# Patient Record
Sex: FEMALE | Race: WHITE | Employment: UNEMPLOYED | URBAN - METROPOLITAN AREA
[De-identification: names, ages, dates, MRNs, and addresses within clinical notes are randomized per-mention and may not be internally consistent; named-entity substitution may affect disease eponyms.]

---

## 2017-01-04 ENCOUNTER — HOSPITAL ENCOUNTER (OUTPATIENT)
Dept: PHYSICAL THERAPY | Age: 52
Discharge: HOME | End: 2017-01-04
Payer: COMMERCIAL

## 2017-01-04 PROCEDURE — 97110 THERAPEUTIC EXERCISES: CPT | Performed by: PHYSICAL THERAPIST

## 2017-01-04 PROCEDURE — 97140 MANUAL THERAPY 1/> REGIONS: CPT | Performed by: PHYSICAL THERAPIST

## 2017-01-04 NOTE — PROGRESS NOTES
PT RE-EVAL NOTE 2-15    Patient Name: Caden Proffer  Date:2017  : 1965  [x]  Patient  Verified  Payor: BLUE CROSS / Plan: Adrienne Rowland [id number] PPO / Product Type: PPO /    In time: 11:00 AM  Out time: 1210 PM  Total Treatment Time (min): 70   Visit #: 10    Treatment Area: Left shoulder pain [M25.512]    SUBJECTIVE  Pain Level (0-10 scale): 0/10  Any medication changes, allergies to medications, adverse drug reactions, diagnosis change, or new procedure performed?: [x] No    [] Yes (see summary sheet for update)  Subjective functional status/changes:   [] No changes reported  Continues to be frustrated that certain motions really hurt. At rest 8/03 pain, with certain movements (ie taking off bra, putting on coat) pain increases to 5-6/10. Pt compliant with HEP. She is no longer having the \"hitching\" that she was reporting at initial eval.    OBJECTIVE    Modality rationale: decrease pain and increase tissue extensibility to improve the patients ability to reach into cabinet.    Min Type Additional Details    [] Estim: []Att   []Unatt        []TENS instruct                  []IFC  []Premod   []NMES                     []Other:  []w/US   []w/ice   []w/heat  Position:  Location:    []  Traction: [] Cervical       []Lumbar                       [] Prone          []Supine                       []Intermittent   []Continuous Lbs:  [] before manual  [] after manual  []w/heat    []  Ultrasound: []Continuous   [] Pulsed at:                            []1MHz   []3MHz Location:  W/cm2:    []  Paraffin         Location:  []w/heat   10 [x]  Ice (post)     []  Heat []  Ice massage Position: seated  Location: L shoulder    []  Laser  []  Other: Position:  Location:    []  Vasopneumatic Device Pressure:       [] lo [] med [] hi   Temperature:    [x] Skin assessment post-treatment:  [x]intact []redness- no adverse reaction    []redness  adverse reaction:     35 min Therapeutic Exercise:  [x] See flow sheet : Re-eval, see below. Added prone scap retractions, prone row, prone shoulder ext   Rationale: increase ROM and increase strength to improve the patients ability to carry objects. -- min Neuromuscular Re-education:  [x]  See flow sheet :    Rationale: increase ROM and increase strength  to improve the patients ability to return to [employer]. 25 min Manual Therapy:  PROM L shoulder flex, IR, ER; posterior/inferior grade II-III mobs; STM R bicep tendon   Rationale: decrease pain, increase ROM and increase tissue extensibility  to improve the patients ability to lift objects. With   [x] TE   [] TA   [] neuro   [] other: Patient Education: [x] Review HEP    [] Progressed/Changed HEP based on:   [] positioning   [] body mechanics   [] transfers   [x] heat/ice application    [x] other: massage L bicep tendon     Other Objective/Functional Measures:   Severe TTP L bicep tendon and muscle belly  AROM flex 175 deg, pain  PROM flex 175 deg, pain; PROM scap approx 150 deg, pain  PROM ER 65 deg, pain  PROM IR 55 deg, pain  Strength    Flex 4+/5, pain   Scap 4/5, pain   IR 5/5, no pain   ER, 4+/5, no pain     FOTO= 60/100    Pain Level (0-10 scale) post treatment: 0/10    ASSESSMENT/Changes in Function:     Pt has completed 10 skilled PT visits at this time. . Pt has met 3/4 short term goals and 0/4 long term goals. Pt's FOTO decreased from 63 at initial eval to 60/100. Pt with increased AROM flexion, however continues to have decreased PROM ER, IR. Pt has increased overall shoulder strength since initial eval. Added prone scapular retractions, prone row, and prone shoulder ext today. Patient will continue to benefit from skilled PT services to modify and progress therapeutic interventions, address functional mobility deficits, address ROM deficits, address strength deficits, analyze and address soft tissue restrictions and analyze and cue movement patterns to attain remaining goals.      [x] See Plan of Care  []  See progress note/recertification  []  See Discharge Summary         Progress towards goals / Updated goals:  Short Term Goals: To be accomplished in 3 weeks:  1) Pt will be independent in HEP. MET  2) Pt will report being able to sleep throughout the night without being woken from pain. MET  3) Pt will report < or = 2/10 pain at worst throughout the day in order to perform ADL's. Progressing  4) Pt will report decrease in \"hitching\" of L shoulder by at least 50% in order to don/doff clothing. MET     Long Term Goals: To be accomplished in 6-8 weeks:  1) Pt will increase L shoulder flexion ROM to 180 deg in order to reach into cabinets. Progressing  2) Pt will increase L shoulder ER ROM to at least 90 deg in order to wash/dry hair. Progressing  3) Pt will increase L shoulder overall strength to at least 4+/5 in order to perform pottery work.  Progressing  4) Pt will report < or = 1/10 pain throughout the day in order to lift groceries and perform ADL's.   Progressing    PLAN  [x]  Upgrade activities as tolerated     [x]  Continue plan of care  []  Update interventions per flow sheet       []  Discharge due to:_  []  Other:_      Phani Morton PT 1/4/2017  11:44 AM

## 2017-01-06 ENCOUNTER — HOSPITAL ENCOUNTER (OUTPATIENT)
Dept: PHYSICAL THERAPY | Age: 52
Discharge: HOME | End: 2017-01-06
Payer: COMMERCIAL

## 2017-01-06 PROCEDURE — 97140 MANUAL THERAPY 1/> REGIONS: CPT | Performed by: PHYSICAL THERAPIST

## 2017-01-06 PROCEDURE — 97110 THERAPEUTIC EXERCISES: CPT | Performed by: PHYSICAL THERAPIST

## 2017-01-06 NOTE — PROGRESS NOTES
PT DAILY NOTE 2-15    Patient Name: Eugenia Champagne  Date:2017  : 1965  [x]  Patient  Verified  Payor: BLUE CROSS / Plan: Adrienne Rowland [id number] PPO / Product Type: PPO /    In time: 8:00 AM  Out time: 850 AM  Total Treatment Time (min): 50   Visit #: 11    Treatment Area: Left shoulder pain [M25.512]    SUBJECTIVE  Pain Level (0-10 scale): 1/10  Any medication changes, allergies to medications, adverse drug reactions, diagnosis change, or new procedure performed?: [x] No    [] Yes (see summary sheet for update)  Subjective functional status/changes:   [] No changes reported  \"A little pain today because I slept on it wrong last night\"  Pt has been trying to use L UE as much as possible with lifting, reaching for objects   Pt has to leave 10 min early today to make a doctor's appt    OBJECTIVE    Modality rationale: decrease pain and increase tissue extensibility to improve the patients ability to reach into cabinet.    Min Type Additional Details    [] Estim: []Att   []Unatt        []TENS instruct                  []IFC  []Premod   []NMES                     []Other:  []w/US   []w/ice   []w/heat  Position:  Location:    []  Traction: [] Cervical       []Lumbar                       [] Prone          []Supine                       []Intermittent   []Continuous Lbs:  [] before manual  [] after manual  []w/heat    []  Ultrasound: []Continuous   [] Pulsed at:                            []1MHz   []3MHz Location:  W/cm2:    []  Paraffin         Location:  []w/heat   10 [x]  Ice (post)     []  Heat []  Ice massage Position: seated  Location: L shoulder    []  Laser  []  Other: Position:  Location:    []  Vasopneumatic Device Pressure:       [] lo [] med [] hi   Temperature:    [x] Skin assessment post-treatment:  [x]intact []redness- no adverse reaction    []redness  adverse reaction:     15 min Therapeutic Exercise:  [x] See flow sheet : pulleys, prone scap retractions, shoulder ext, row; wand ER, flex, scap  Added IR with pulleys today   Rationale: increase ROM and increase strength to improve the patients ability to carry objects. -- min Neuromuscular Re-education:  [x]  See flow sheet :    Rationale: increase ROM and increase strength  to improve the patients ability to return to [employer]. 25 min Manual Therapy:  PROM L shoulder flex, IR, ER; posterior/inferior grade II-III mobs; STM R bicep tendon   Rationale: decrease pain, increase ROM and increase tissue extensibility  to improve the patients ability to lift objects. With   [x] TE   [] TA   [] neuro   [] other: Patient Education: [x] Review HEP    [] Progressed/Changed HEP based on:   [] positioning   [] body mechanics   [] transfers   [x] heat/ice application    [x] other: massage L bicep tendon     Other Objective/Functional Measures:   n/t    Pain Level (0-10 scale) post treatment: 0/10    ASSESSMENT/Changes in Function:     Pt with decreased TTP over L bicep tendon today compared to past visits. She continues to have limited PROM, compliant with HEP. Added IR with pulleys today. Overall tolerated therapy session well. Patient will continue to benefit from skilled PT services to modify and progress therapeutic interventions, address functional mobility deficits, address ROM deficits, address strength deficits, analyze and address soft tissue restrictions and analyze and cue movement patterns to attain remaining goals. [x]  See Plan of Care  []  See progress note/recertification  []  See Discharge Summary         Progress towards goals / Updated goals:  Short Term Goals: To be accomplished in 3 weeks:  1) Pt will be independent in HEP. MET  2) Pt will report being able to sleep throughout the night without being woken from pain. MET  3) Pt will report < or = 2/10 pain at worst throughout the day in order to perform ADL's.  Progressing  4) Pt will report decrease in \"hitching\" of L shoulder by at least 50% in order to don/doff clothing. MET     Long Term Goals: To be accomplished in 6-8 weeks:  1) Pt will increase L shoulder flexion ROM to 180 deg in order to reach into cabinets. Progressing  2) Pt will increase L shoulder ER ROM to at least 90 deg in order to wash/dry hair. Progressing  3) Pt will increase L shoulder overall strength to at least 4+/5 in order to perform pottery work.  Progressing  4) Pt will report < or = 1/10 pain throughout the day in order to lift groceries and perform ADL's.   Progressing    PLAN  [x]  Upgrade activities as tolerated     [x]  Continue plan of care  []  Update interventions per flow sheet       []  Discharge due to:_  []  Other:_      Annabelle Oviedo PT 1/6/2017  11:44 AM

## 2017-01-11 ENCOUNTER — HOSPITAL ENCOUNTER (OUTPATIENT)
Dept: PHYSICAL THERAPY | Age: 52
Discharge: HOME | End: 2017-01-11
Payer: COMMERCIAL

## 2017-01-11 PROCEDURE — 97110 THERAPEUTIC EXERCISES: CPT | Performed by: PHYSICAL THERAPIST

## 2017-01-11 PROCEDURE — 97140 MANUAL THERAPY 1/> REGIONS: CPT | Performed by: PHYSICAL THERAPIST

## 2017-01-11 NOTE — PROGRESS NOTES
PT DAILY NOTE 2-15    Patient Name: Loida Flannery  Date:2017  : 1965  [x]  Patient  Verified  Payor: BLUE CROSS / Plan: Adrienne Rowland [id number] PPO / Product Type: PPO /    In time: 10:30 AM  Out time: 11:30 AM  Total Treatment Time (min): 60  Visit #: 12    Treatment Area: Left shoulder pain [M25.512]    SUBJECTIVE  Pain Level (0-10 scale): 0/10  Any medication changes, allergies to medications, adverse drug reactions, diagnosis change, or new procedure performed?: [x] No    [] Yes (see summary sheet for update)  Subjective functional status/changes:   [] No changes reported  \"I feel good today\". Pt got FR and has been doing jovanny stretch at home. OBJECTIVE    Modality rationale: decrease pain and increase tissue extensibility to improve the patients ability to reach into cabinet. Min Type Additional Details    [] Estim: []Att   []Unatt        []TENS instruct                  []IFC  []Premod   []NMES                     []Other:  []w/US   []w/ice   []w/heat  Position:  Location:    []  Traction: [] Cervical       []Lumbar                       [] Prone          []Supine                       []Intermittent   []Continuous Lbs:  [] before manual  [] after manual  []w/heat    []  Ultrasound: []Continuous   [] Pulsed at:                            []1MHz   []3MHz Location:  W/cm2:    []  Paraffin         Location:  []w/heat   10 [x]  Ice (post)     []  Heat []  Ice massage Position: seated  Location: L shoulder    []  Laser  []  Other: Position:  Location:    []  Vasopneumatic Device Pressure:       [] lo [] med [] hi   Temperature:    [x] Skin assessment post-treatment:  [x]intact []redness- no adverse reaction    []redness  adverse reaction:     25 min Therapeutic Exercise:  [x] See flow sheet : pulleys, prone scap retractions, shoulder ext, row; wand ER, flex, scap.  IR with pulleys   Added sidelying ER, AROM D1, D2 supine; tband IR   Rationale: increase ROM and increase strength to improve the patients ability to carry objects. -- min Neuromuscular Re-education:  [x]  See flow sheet :    Rationale: increase ROM and increase strength  to improve the patients ability to return to [employer]. 25 min Manual Therapy:  PROM L shoulder flex, IR, ER; posterior/inferior grade II-III mobs; STM R bicep tendon   Rationale: decrease pain, increase ROM and increase tissue extensibility  to improve the patients ability to lift objects. With   [x] TE   [] TA   [] neuro   [] other: Patient Education: [x] Review HEP    [] Progressed/Changed HEP based on:   [] positioning   [] body mechanics   [] transfers   [x] heat/ice application    [x] other: massage L bicep tendon     Other Objective/Functional Measures:   n/t    Pain Level (0-10 scale) post treatment: 0/10    ASSESSMENT/Changes in Function:     Pt tolerated light strengthening exercises today-- \"tired\" after sidelying ER and prone shoulder extension, however no increase in pain. Continues to have limited ROM, high pain levels at end range PROM ER, IR, abduction. Updated HEP to include sidelying ER without weight. Patient will continue to benefit from skilled PT services to modify and progress therapeutic interventions, address functional mobility deficits, address ROM deficits, address strength deficits, analyze and address soft tissue restrictions and analyze and cue movement patterns to attain remaining goals. [x]  See Plan of Care  []  See progress note/recertification  []  See Discharge Summary         Progress towards goals / Updated goals:  Short Term Goals: To be accomplished in 3 weeks:  1) Pt will be independent in HEP. MET  2) Pt will report being able to sleep throughout the night without being woken from pain. MET  3) Pt will report < or = 2/10 pain at worst throughout the day in order to perform ADL's.  Progressing  4) Pt will report decrease in \"hitching\" of L shoulder by at least 50% in order to don/doff clothing. MET     Long Term Goals: To be accomplished in 6-8 weeks:  1) Pt will increase L shoulder flexion ROM to 180 deg in order to reach into cabinets. Progressing  2) Pt will increase L shoulder ER ROM to at least 90 deg in order to wash/dry hair. Progressing  3) Pt will increase L shoulder overall strength to at least 4+/5 in order to perform pottery work.  Progressing  4) Pt will report < or = 1/10 pain throughout the day in order to lift groceries and perform ADL's.   Progressing    PLAN  [x]  Upgrade activities as tolerated     [x]  Continue plan of care  []  Update interventions per flow sheet       []  Discharge due to:_  []  Other:_      Sheila Ochoa, PT 1/11/2017  10:35 AM

## 2017-01-13 ENCOUNTER — HOSPITAL ENCOUNTER (OUTPATIENT)
Dept: PHYSICAL THERAPY | Age: 52
Discharge: HOME | End: 2017-01-13
Payer: COMMERCIAL

## 2017-01-13 PROCEDURE — 97110 THERAPEUTIC EXERCISES: CPT | Performed by: PHYSICAL THERAPIST

## 2017-01-13 PROCEDURE — 97140 MANUAL THERAPY 1/> REGIONS: CPT | Performed by: PHYSICAL THERAPIST

## 2017-01-13 NOTE — PROGRESS NOTES
PT DAILY NOTE 2-15    Patient Name: Rubin Allen  Date:2017  : 1965  [x]  Patient  Verified  Payor: BLUE JAY / Plan: Adrienne Rowland [id number] PPO / Product Type: PPO /    In time: 830 AM  Out time: 945 AM  Total Treatment Time (min): 75 (65 timed)  Visit #: 13    Treatment Area: Left shoulder pain [M25.512]    SUBJECTIVE  Pain Level (0-10 scale): 1/10  Any medication changes, allergies to medications, adverse drug reactions, diagnosis change, or new procedure performed?: [x] No    [] Yes (see summary sheet for update)  Subjective functional status/changes:   [] No changes reported  Sight soreness after last visit, but no increase in pain. OBJECTIVE    Modality rationale: decrease pain and increase tissue extensibility to improve the patients ability to reach into cabinet. Min Type Additional Details    [] Estim: []Att   []Unatt        []TENS instruct                  []IFC  []Premod   []NMES                     []Other:  []w/US   []w/ice   []w/heat  Position:  Location:    []  Traction: [] Cervical       []Lumbar                       [] Prone          []Supine                       []Intermittent   []Continuous Lbs:  [] before manual  [] after manual  []w/heat    []  Ultrasound: []Continuous   [] Pulsed at:                            []1MHz   []3MHz Location:  W/cm2:    []  Paraffin         Location:  []w/heat   10 [x]  Ice (post)     []  Heat []  Ice massage Position: seated  Location: L shoulder    []  Laser  []  Other: Position:  Location:    []  Vasopneumatic Device Pressure:       [] lo [] med [] hi   Temperature:    [x] Skin assessment post-treatment:  [x]intact []redness- no adverse reaction    []redness  adverse reaction:     50 min Therapeutic Exercise:  [x] See flow sheet : pulleys, prone scap retractions, shoulder ext, row; wand ER, flex, scap.  IR with pulleys   sidelying ER, AROM D1, D2 supine; tband IR  Added tband ER, serratus punches, and B ER   Rationale: increase ROM and increase strength to improve the patients ability to carry objects. -- min Neuromuscular Re-education:  [x]  See flow sheet :    Rationale: increase ROM and increase strength  to improve the patients ability to return to [employer]. 15 min Manual Therapy:  PROM L shoulder flex, IR, ER; posterior/inferior grade II-III mobs; STM R bicep tendon   Rationale: decrease pain, increase ROM and increase tissue extensibility  to improve the patients ability to lift objects. With   [x] TE   [] TA   [] neuro   [] other: Patient Education: [x] Review HEP    [] Progressed/Changed HEP based on:   [] positioning   [] body mechanics   [] transfers   [x] heat/ice application    [x] other: massage L bicep tendon     Other Objective/Functional Measures:   n/t    Pain Level (0-10 scale) post treatment: 0/10    ASSESSMENT/Changes in Function:     Pt continuing to have high pain levels while therapist performing PROM, deepali with IR and abduction. Pt tolerated strengthening exercises well today with exacerbation of symptoms. Progressing with RTC and parascapular strengthening. Patient will continue to benefit from skilled PT services to modify and progress therapeutic interventions, address functional mobility deficits, address ROM deficits, address strength deficits, analyze and address soft tissue restrictions and analyze and cue movement patterns to attain remaining goals. [x]  See Plan of Care  []  See progress note/recertification  []  See Discharge Summary         Progress towards goals / Updated goals:  Short Term Goals: To be accomplished in 3 weeks:  1) Pt will be independent in HEP. MET  2) Pt will report being able to sleep throughout the night without being woken from pain. MET  3) Pt will report < or = 2/10 pain at worst throughout the day in order to perform ADL's. Progressing  4) Pt will report decrease in \"hitching\" of L shoulder by at least 50% in order to don/doff clothing.  MET     Long Term Goals: To be accomplished in 6-8 weeks:  1) Pt will increase L shoulder flexion ROM to 180 deg in order to reach into cabinets. Progressing  2) Pt will increase L shoulder ER ROM to at least 90 deg in order to wash/dry hair. Progressing  3) Pt will increase L shoulder overall strength to at least 4+/5 in order to perform pottery work.  Progressing  4) Pt will report < or = 1/10 pain throughout the day in order to lift groceries and perform ADL's.   Progressing    PLAN  [x]  Upgrade activities as tolerated     [x]  Continue plan of care  []  Update interventions per flow sheet       []  Discharge due to:_  []  Other:_      Nuris House, PT 1/13/2017  10:14 AM

## 2017-01-18 ENCOUNTER — HOSPITAL ENCOUNTER (OUTPATIENT)
Dept: PHYSICAL THERAPY | Age: 52
Discharge: HOME | End: 2017-01-18
Payer: COMMERCIAL

## 2017-01-18 PROCEDURE — 97110 THERAPEUTIC EXERCISES: CPT | Performed by: PHYSICAL THERAPIST

## 2017-01-18 PROCEDURE — 97140 MANUAL THERAPY 1/> REGIONS: CPT | Performed by: PHYSICAL THERAPIST

## 2017-01-23 ENCOUNTER — HOSPITAL ENCOUNTER (OUTPATIENT)
Dept: PHYSICAL THERAPY | Age: 52
Discharge: HOME | End: 2017-01-23
Payer: COMMERCIAL

## 2017-01-23 PROCEDURE — 97110 THERAPEUTIC EXERCISES: CPT | Performed by: PHYSICAL THERAPIST

## 2017-01-23 PROCEDURE — 97140 MANUAL THERAPY 1/> REGIONS: CPT | Performed by: PHYSICAL THERAPIST

## 2017-01-23 NOTE — PROGRESS NOTES
PT DAILY NOTE 2-15    Patient Name: Stacie Garay  Date:2017  : 1965  [x]  Patient  Verified  Payor: BLUE CROSS / Plan: Adrienne Rowland [id number] PPO / Product Type: PPO /    In time: 930 AM  Out time: 1030 AM  Total Treatment Time (min): 60 (60 timed)  Visit #: 15    Treatment Area: Left shoulder pain [M25.512]    SUBJECTIVE  Pain Level (0-10 scale): 3/10  Any medication changes, allergies to medications, adverse drug reactions, diagnosis change, or new procedure performed?: [x] No    [] Yes (see summary sheet for update)  Subjective functional status/changes:   [] No changes reported  Pt went to [place] over the weekend- she took a backpack on the plane and reports \"that was a mistake, my shoulder was killing me. I tried to put it on both shoulders and then just the right one\". She got back on Saturday but the pain has been constant since then. OBJECTIVE    Modality rationale: decrease pain and increase tissue extensibility to improve the patients ability to reach into cabinet.    Min Type Additional Details    [] Estim: []Att   []Unatt        []TENS instruct                  []IFC  []Premod   []NMES                     []Other:  []w/US   []w/ice   []w/heat  Position:  Location:    []  Traction: [] Cervical       []Lumbar                       [] Prone          []Supine                       []Intermittent   []Continuous Lbs:  [] before manual  [] after manual  []w/heat    []  Ultrasound: []Continuous   [] Pulsed at:                            []1MHz   []3MHz Location:  W/cm2:    []  Paraffin         Location:  []w/heat   Held today [x]  Ice (post)     []  Heat []  Ice massage Position: seated  Location: L shoulder    []  Laser  []  Other: Position:  Location:    []  Vasopneumatic Device Pressure:       [] lo [] med [] hi   Temperature:    [x] Skin assessment post-treatment:  [x]intact []redness- no adverse reaction    []redness  adverse reaction:     45 min Therapeutic Exercise:  [x] See flow sheet : pulleys, prone scap retractions, shoulder ext, row; wand ER, flex, scap. IR with pulleys   sidelying ER, AROM D1, D2 supine; tband IR; tband ER, serratus punches, and B ER sidelying ER  Added sidelying shoulder abduction     Rationale: increase ROM and increase strength to improve the patients ability to carry objects. -- min Neuromuscular Re-education:  [x]  See flow sheet :    Rationale: increase ROM and increase strength  to improve the patients ability to return to [employer]. 15 min Manual Therapy:  PROM L shoulder flex, IR, ER; posterior/inferior grade II-III mobs; STM R bicep tendon   Rationale: decrease pain, increase ROM and increase tissue extensibility  to improve the patients ability to lift objects. With   [x] TE   [] TA   [] neuro   [] other: Patient Education: [x] Review HEP    [] Progressed/Changed HEP based on:   [] positioning   [] body mechanics   [] transfers   [x] heat/ice application    [x] other: massage L bicep tendon     Other Objective/Functional Measures:   ER= 75, pain. Capsular end feel  IR= 70, pain. Capsular end feel    Pain Level (0-10 scale) post treatment: 2/10    ASSESSMENT/Changes in Function:     Decreased sets, reps of several exercises today due to increased pain level from wearing a backpack over the weekend. Updated HEP. Pt progressing with ROM, strengthening. Patient will continue to benefit from skilled PT services to modify and progress therapeutic interventions, address functional mobility deficits, address ROM deficits, address strength deficits, analyze and address soft tissue restrictions and analyze and cue movement patterns to attain remaining goals. [x]  See Plan of Care  []  See progress note/recertification  []  See Discharge Summary         Progress towards goals / Updated goals:  Short Term Goals: To be accomplished in 3 weeks:  1) Pt will be independent in HEP.  MET  2) Pt will report being able to sleep throughout the night without being woken from pain. MET  3) Pt will report < or = 2/10 pain at worst throughout the day in order to perform ADL's. Progressing  4) Pt will report decrease in \"hitching\" of L shoulder by at least 50% in order to don/doff clothing. MET     Long Term Goals: To be accomplished in 6-8 weeks:  1) Pt will increase L shoulder flexion ROM to 180 deg in order to reach into cabinets. Progressing  2) Pt will increase L shoulder ER ROM to at least 90 deg in order to wash/dry hair. Progressing  3) Pt will increase L shoulder overall strength to at least 4+/5 in order to perform pottery work.  Progressing  4) Pt will report < or = 1/10 pain throughout the day in order to lift groceries and perform ADL's.   Progressing    PLAN  [x]  Upgrade activities as tolerated     [x]  Continue plan of care  []  Update interventions per flow sheet       []  Discharge due to:_  []  Other:_      Lucio Garcia PT 1/23/2017  9:38  AM

## 2017-01-25 ENCOUNTER — APPOINTMENT (OUTPATIENT)
Dept: PHYSICAL THERAPY | Age: 52
End: 2017-01-25
Payer: COMMERCIAL

## 2017-02-01 ENCOUNTER — HOSPITAL ENCOUNTER (OUTPATIENT)
Dept: PHYSICAL THERAPY | Age: 52
Discharge: HOME | End: 2017-02-01
Payer: COMMERCIAL

## 2017-02-01 PROCEDURE — 97110 THERAPEUTIC EXERCISES: CPT | Performed by: PHYSICAL THERAPIST

## 2017-02-01 PROCEDURE — 97140 MANUAL THERAPY 1/> REGIONS: CPT | Performed by: PHYSICAL THERAPIST

## 2017-02-01 NOTE — PROGRESS NOTES
PT DAILY NOTE 2-15    Patient Name: Roula Kingston  Date:2017  : 1965  [x]  Patient  Verified  Payor: BLUE CROSS / Plan: Adrienne Rowland [id number] PPO / Product Type: PPO /    In time: 10:35 AM  Out time: 11:40 AM  Total Treatment Time (min): 65 (55 timed)  Visit #: 16    Treatment Area: Left shoulder pain [M25.512]    SUBJECTIVE  Pain Level (0-10 scale): 0/10  Any medication changes, allergies to medications, adverse drug reactions, diagnosis change, or new procedure performed?: [x] No    [] Yes (see summary sheet for update)  Subjective functional status/changes:   [] No changes reported  Pt went to [facility] over the weekend- had massages on shoulder and \"neuromuscular therapy- basically a massage\". No pain, she thinks it feels better       OBJECTIVE    Modality rationale: decrease pain and increase tissue extensibility to improve the patients ability to reach into cabinet. Min Type Additional Details    [] Estim: []Att   []Unatt        []TENS instruct                  []IFC  []Premod   []NMES                     []Other:  []w/US   []w/ice   []w/heat  Position:  Location:    []  Traction: [] Cervical       []Lumbar                       [] Prone          []Supine                       []Intermittent   []Continuous Lbs:  [] before manual  [] after manual  []w/heat    []  Ultrasound: []Continuous   [] Pulsed at:                            []1MHz   []3MHz Location:  W/cm2:    []  Paraffin         Location:  []w/heat   10 [x]  Ice (post)     []  Heat []  Ice massage Position: seated  Location: L shoulder    []  Laser  []  Other: Position:  Location:    []  Vasopneumatic Device Pressure:       [] lo [] med [] hi   Temperature:    [x] Skin assessment post-treatment:  [x]intact []redness- no adverse reaction    []redness  adverse reaction:     40 min Therapeutic Exercise:  [x] See flow sheet : pulleys, prone scap retractions, shoulder ext, row; wand ER, flex, scap.  IR with pulleys   sidelying ER, AROM D1, D2 supine; tband IR; tband ER, serratus punches, and B ER sidelying ER, sidelying shoulder abduction  Added PG alt UE's, sleeper stretch     Rationale: increase ROM and increase strength to improve the patients ability to carry objects. -- min Neuromuscular Re-education:  [x]  See flow sheet :    Rationale: increase ROM and increase strength  to improve the patients ability to return to [employer]. 15 min Manual Therapy:  PROM L shoulder flex, IR, ER; posterior/inferior grade II-III mobs; STM R bicep tendon   Rationale: decrease pain, increase ROM and increase tissue extensibility  to improve the patients ability to lift objects. With   [x] TE   [] TA   [] neuro   [] other: Patient Education: [x] Review HEP    [] Progressed/Changed HEP based on:   [] positioning   [] body mechanics   [] transfers   [x] heat/ice application    [x] other: massage L bicep tendon     Other Objective/Functional Measures:   n/a    Pain Level (0-10 scale) post treatment: 0/10    ASSESSMENT/Changes in Function:     Progressed tband IR/ER to red- tolerated progression well. Pt fatigued after PG alt UE's and reported intense stretch with sleeper stretch. Overall pt tolerated therapy session well and is continuing to progress with strengthening program as tolerated. Patient will continue to benefit from skilled PT services to modify and progress therapeutic interventions, address functional mobility deficits, address ROM deficits, address strength deficits, analyze and address soft tissue restrictions and analyze and cue movement patterns to attain remaining goals. [x]  See Plan of Care  []  See progress note/recertification  []  See Discharge Summary         Progress towards goals / Updated goals:  Short Term Goals: To be accomplished in 3 weeks:  1) Pt will be independent in HEP. MET  2) Pt will report being able to sleep throughout the night without being woken from pain.  MET  3) Pt will report < or = 2/10 pain at worst throughout the day in order to perform ADL's. Progressing  4) Pt will report decrease in \"hitching\" of L shoulder by at least 50% in order to don/doff clothing. MET     Long Term Goals: To be accomplished in 6-8 weeks:  1) Pt will increase L shoulder flexion ROM to 180 deg in order to reach into cabinets. Progressing  2) Pt will increase L shoulder ER ROM to at least 90 deg in order to wash/dry hair. Progressing  3) Pt will increase L shoulder overall strength to at least 4+/5 in order to perform pottery work.  Progressing  4) Pt will report < or = 1/10 pain throughout the day in order to lift groceries and perform ADL's.   Progressing    PLAN  [x]  Upgrade activities as tolerated     [x]  Continue plan of care  []  Update interventions per flow sheet       []  Discharge due to:_  []  Other:_      Nohemi Lezama, PT 2/1/2017  11:03  AM

## 2017-02-08 ENCOUNTER — HOSPITAL ENCOUNTER (OUTPATIENT)
Dept: PHYSICAL THERAPY | Age: 52
Discharge: HOME | End: 2017-02-08
Payer: COMMERCIAL

## 2017-02-08 PROCEDURE — 97140 MANUAL THERAPY 1/> REGIONS: CPT | Performed by: PHYSICAL THERAPIST

## 2017-02-08 PROCEDURE — 97110 THERAPEUTIC EXERCISES: CPT | Performed by: PHYSICAL THERAPIST

## 2017-02-08 NOTE — PROGRESS NOTES
PT DAILY NOTE 2-15    Patient Name: Rubin Allen  Date:2017  : 1965  [x]  Patient  Verified  Payor: BLUE CROSS / Plan: Adrienne Rowland [id number] PPO / Product Type: PPO /    In time: 12:30 PM  Out time: 1:40 PM  Total Treatment Time (min): 70 (60 timed)  Visit #: 17    Treatment Area: Left shoulder pain [M25.512]    SUBJECTIVE  Pain Level (0-10 scale): 0/10  Any medication changes, allergies to medications, adverse drug reactions, diagnosis change, or new procedure performed?: [x] No    [] Yes (see summary sheet for update)  Subjective functional status/changes:   [] No changes reported  Pt has overall been feeling pretty good. On  she went to push herself off the stairs (reached behind) and \"I really tweaked it, it was absolutely killing me\"       OBJECTIVE    Modality rationale: decrease pain and increase tissue extensibility to improve the patients ability to reach into cabinet. Min Type Additional Details    [] Estim: []Att   []Unatt        []TENS instruct                  []IFC  []Premod   []NMES                     []Other:  []w/US   []w/ice   []w/heat  Position:  Location:    []  Traction: [] Cervical       []Lumbar                       [] Prone          []Supine                       []Intermittent   []Continuous Lbs:  [] before manual  [] after manual  []w/heat    []  Ultrasound: []Continuous   [] Pulsed at:                            []1MHz   []3MHz Location:  W/cm2:    []  Paraffin         Location:  []w/heat   10 [x]  Ice (post)     []  Heat []  Ice massage Position: seated  Location: L shoulder    []  Laser  []  Other: Position:  Location:    []  Vasopneumatic Device Pressure:       [] lo [] med [] hi   Temperature:    [x] Skin assessment post-treatment:  [x]intact []redness- no adverse reaction    []redness  adverse reaction:     40 min Therapeutic Exercise:  [x] See flow sheet : pulleys, prone scap retractions, shoulder ext, row; wand ER, flex, scap.  IR with pulleys sidelying ER, AROM D1, D2 supine; tband IR; tband ER, serratus punches, and B ER sidelying ER, sidelying shoulder abduction  PG alt UE's, sleeper stretch     Rationale: increase ROM and increase strength to improve the patients ability to carry objects. -- min Neuromuscular Re-education:  [x]  See flow sheet :    Rationale: increase ROM and increase strength  to improve the patients ability to return to [employer]. 20 min Manual Therapy:  PROM L shoulder flex, IR, ER; posterior/inferior grade II-III mobs; STM R bicep tendon   Rationale: decrease pain, increase ROM and increase tissue extensibility  to improve the patients ability to lift objects. With   [x] TE   [] TA   [] neuro   [] other: Patient Education: [x] Review HEP    [] Progressed/Changed HEP based on:   [] positioning   [] body mechanics   [] transfers   [x] heat/ice application    [x] other: massage L bicep tendon     Other Objective/Functional Measures: Mod TTP R bicep tendon  Flexion AROM= 170 deg, PROM= 180 (pain)  PROM ER= 80 deg  PROM IR= 75 deg    Pain Level (0-10 scale) post treatment: 0/10    ASSESSMENT/Changes in Function:     Pt fatigued at end of session. Pt continues to have decreased flex, ER, IR ROM however has increased ROM since beginning PT. Pt progressing with L shoulder strengthening as tolerated. Overall tolerated therapy session well. Patient will continue to benefit from skilled PT services to modify and progress therapeutic interventions, address functional mobility deficits, address ROM deficits, address strength deficits, analyze and address soft tissue restrictions and analyze and cue movement patterns to attain remaining goals. [x]  See Plan of Care  []  See progress note/recertification  []  See Discharge Summary         Progress towards goals / Updated goals:  Short Term Goals: To be accomplished in 3 weeks:  1) Pt will be independent in HEP.  MET  2) Pt will report being able to sleep throughout the night without being woken from pain. MET  3) Pt will report < or = 2/10 pain at worst throughout the day in order to perform ADL's. Progressing  4) Pt will report decrease in \"hitching\" of L shoulder by at least 50% in order to don/doff clothing. MET     Long Term Goals: To be accomplished in 6-8 weeks:  1) Pt will increase L shoulder flexion ROM to 180 deg in order to reach into cabinets. Progressing  2) Pt will increase L shoulder ER ROM to at least 90 deg in order to wash/dry hair. Progressing  3) Pt will increase L shoulder overall strength to at least 4+/5 in order to perform pottery work.  Progressing  4) Pt will report < or = 1/10 pain throughout the day in order to lift groceries and perform ADL's.   Progressing    PLAN  [x]  Upgrade activities as tolerated     [x]  Continue plan of care  []  Update interventions per flow sheet       []  Discharge due to:_  []  Other:_      Karolyn Vasquez, PT 2/8/2017  1:07 PM

## 2017-02-16 ENCOUNTER — HOSPITAL ENCOUNTER (OUTPATIENT)
Dept: PHYSICAL THERAPY | Age: 52
Discharge: HOME | End: 2017-02-16
Payer: COMMERCIAL

## 2017-02-16 PROCEDURE — 97140 MANUAL THERAPY 1/> REGIONS: CPT | Performed by: PHYSICAL THERAPIST

## 2017-02-16 PROCEDURE — 97110 THERAPEUTIC EXERCISES: CPT | Performed by: PHYSICAL THERAPIST

## 2017-02-16 NOTE — PROGRESS NOTES
PT DAILY NOTE 2-15    Patient Name: Mitch Odell  Date:2017  : 1965  [x]  Patient  Verified  Payor: BLUE CROSS / Plan: Adrienne Rowland [id number] PPO / Product Type: PPO /    In time: 1:35 PM  Out time: 2:40 PM  Total Treatment Time (min): 65 (55 timed)  Visit #: 18    Treatment Area: Left shoulder pain [M25.512]    SUBJECTIVE  Pain Level (0-10 scale): 1-2/10  Any medication changes, allergies to medications, adverse drug reactions, diagnosis change, or new procedure performed?: [x] No    [] Yes (see summary sheet for update)  Subjective functional status/changes:   [] No changes reported  Pt reports she has been feeling good and is back to doing most activities with her left arm. \"every once in a while when I go to move a certain way I'll get pain but overall its a lot better\"  She has been fighting a cold over the last week and hasn't been able to stretch as much as usual       OBJECTIVE    Modality rationale: decrease pain and increase tissue extensibility to improve the patients ability to reach into cabinet.    Min Type Additional Details    [] Estim: []Att   []Unatt        []TENS instruct                  []IFC  []Premod   []NMES                     []Other:  []w/US   []w/ice   []w/heat  Position:  Location:    []  Traction: [] Cervical       []Lumbar                       [] Prone          []Supine                       []Intermittent   []Continuous Lbs:  [] before manual  [] after manual  []w/heat    []  Ultrasound: []Continuous   [] Pulsed at:                            []1MHz   []3MHz Location:  W/cm2:    []  Paraffin         Location:  []w/heat   10 [x]  Ice (post)     []  Heat []  Ice massage Position: seated  Location: L shoulder    []  Laser  []  Other: Position:  Location:    []  Vasopneumatic Device Pressure:       [] lo [] med [] hi   Temperature:    [x] Skin assessment post-treatment:  [x]intact []redness- no adverse reaction    []redness  adverse reaction:     40 min Therapeutic Exercise:  [x] See flow sheet : pulleys, prone scap retractions, shoulder ext, row; wand ER, flex, scap. IR with pulleys   sidelying ER, AROM D1, D2 supine; tband IR; tband ER, serratus punches, and B ER sidelying ER, sidelying shoulder abduction  PG alt UE's, sleeper stretch     Rationale: increase ROM and increase strength to improve the patients ability to carry objects. -- min Neuromuscular Re-education:  [x]  See flow sheet :    Rationale: increase ROM and increase strength  to improve the patients ability to return to [employer]. 15 min Manual Therapy:  PROM L shoulder flex, IR, ER; posterior/inferior grade II-III mobs; STM R bicep tendon   Rationale: decrease pain, increase ROM and increase tissue extensibility  to improve the patients ability to lift objects. With   [x] TE   [] TA   [] neuro   [] other: Patient Education: [x] Review HEP    [] Progressed/Changed HEP based on:   [] positioning   [] body mechanics   [] transfers   [x] heat/ice application    [x] other: massage L bicep tendon     Other Objective/Functional Measures:   Min TTP over L bicep tendon    Pain Level (0-10 scale) post treatment: 0/10    ASSESSMENT/Changes in Function:     Pt progressing well with strengthening and ROM. Inc pain at end range with PROM ER, IR, flexion; firm end feel with all motions. Pt with decreased TTP over L bicep tendon compared to previous sessions. Overall tolerated therapy session well. Patient will continue to benefit from skilled PT services to modify and progress therapeutic interventions, address functional mobility deficits, address ROM deficits, address strength deficits, analyze and address soft tissue restrictions and analyze and cue movement patterns to attain remaining goals. [x]  See Plan of Care  []  See progress note/recertification  []  See Discharge Summary         Progress towards goals / Updated goals:  Short Term Goals:  To be accomplished in 3 weeks:  1) Pt will be independent in HEP. MET  2) Pt will report being able to sleep throughout the night without being woken from pain. MET  3) Pt will report < or = 2/10 pain at worst throughout the day in order to perform ADL's. Progressing  4) Pt will report decrease in \"hitching\" of L shoulder by at least 50% in order to don/doff clothing. MET     Long Term Goals: To be accomplished in 6-8 weeks:  1) Pt will increase L shoulder flexion ROM to 180 deg in order to reach into cabinets. Progressing  2) Pt will increase L shoulder ER ROM to at least 90 deg in order to wash/dry hair. Progressing  3) Pt will increase L shoulder overall strength to at least 4+/5 in order to perform pottery work.  Progressing  4) Pt will report < or = 1/10 pain throughout the day in order to lift groceries and perform ADL's.   Progressing    PLAN  [x]  Upgrade activities as tolerated     [x]  Continue plan of care  []  Update interventions per flow sheet       []  Discharge due to:_  []  Other:_      Bam Garcia, PT 2/16/2017  1:40 PM

## 2017-02-23 ENCOUNTER — HOSPITAL ENCOUNTER (OUTPATIENT)
Dept: PHYSICAL THERAPY | Age: 52
Discharge: HOME | End: 2017-02-23
Payer: COMMERCIAL

## 2017-02-23 PROCEDURE — 97140 MANUAL THERAPY 1/> REGIONS: CPT | Performed by: PHYSICAL THERAPIST

## 2017-02-23 PROCEDURE — 97110 THERAPEUTIC EXERCISES: CPT | Performed by: PHYSICAL THERAPIST

## 2017-02-23 NOTE — PROGRESS NOTES
PT DAILY NOTE 2-15    Patient Name: Claude Lawman  Date:2017  : 1965  [x]  Patient  Verified  Payor: BLUE CROSS / Plan: Adrienne Rowland [id number] PPO / Product Type: PPO /    In time: 8:00 AM  Out time: 9:05 AM  Total Treatment Time (min): 65 (55 timed)  Visit #: 19    Treatment Area: Left shoulder pain [M25.512]    SUBJECTIVE  Pain Level (0-10 scale): 0/10  Any medication changes, allergies to medications, adverse drug reactions, diagnosis change, or new procedure performed?: [x] No    [] Yes (see summary sheet for update)  Subjective functional status/changes:   [] No changes reported  Pt reports she had to do pushups for a physical at the doctors office yesterday- \"Its sore from that but otherwise I can't think of anything that really bothered it since I saw you last\"      OBJECTIVE    Modality rationale: decrease pain and increase tissue extensibility to improve the patients ability to reach into cabinet.    Min Type Additional Details    [] Estim: []Att   []Unatt        []TENS instruct                  []IFC  []Premod   []NMES                     []Other:  []w/US   []w/ice   []w/heat  Position:  Location:    []  Traction: [] Cervical       []Lumbar                       [] Prone          []Supine                       []Intermittent   []Continuous Lbs:  [] before manual  [] after manual  []w/heat    []  Ultrasound: []Continuous   [] Pulsed at:                            []1MHz   []3MHz Location:  W/cm2:    []  Paraffin         Location:  []w/heat   10 [x]  Ice (post)     []  Heat []  Ice massage Position: seated  Location: L shoulder    []  Laser  []  Other: Position:  Location:    []  Vasopneumatic Device Pressure:       [] lo [] med [] hi   Temperature:    [x] Skin assessment post-treatment:  [x]intact []redness- no adverse reaction    []redness  adverse reaction:     30 min Therapeutic Exercise:  [x] See flow sheet : pulleys, prone scap retractions, shoulder ext, row;  IR with pulleys sidelying ER; tband IR; tband ER, serratus punches, and B ER sidelying ER, sidelying shoulder abduction  PG alt UE's, sleeper stretch     Rationale: increase ROM and increase strength to improve the patients ability to carry objects. -- min Neuromuscular Re-education:  [x]  See flow sheet :    Rationale: increase ROM and increase strength  to improve the patients ability to return to [employer]. 25 min Manual Therapy:  PROM L shoulder flex, IR, ER; posterior/inferior grade II-III mobs; STM R bicep tendon  Resisted D1/D2 supine  Resisted shoulder flex    Rationale: decrease pain, increase ROM and increase tissue extensibility  to improve the patients ability to lift objects. With   [x] TE   [] TA   [] neuro   [] other: Patient Education: [x] Review HEP    [] Progressed/Changed HEP based on:   [] positioning   [] body mechanics   [] transfers   [x] heat/ice application    [x] other: massage L bicep tendon     Other Objective/Functional Measures:   n/a    Pain Level (0-10 scale) post treatment: 0/10 \"sore\"    ASSESSMENT/Changes in Function:     Pt tolerated manual therapy well today- increased ease with PROM. Pt continues to demonstrate decreased ER, IR PROM. Pt reports functional use of L UE however \"I can still feel that its a lot weaker than the other one\". Pt progressing well with strengthening exercises and demonstrates increased toleration of PROM. Patient will continue to benefit from skilled PT services to modify and progress therapeutic interventions, address functional mobility deficits, address ROM deficits, address strength deficits, analyze and address soft tissue restrictions and analyze and cue movement patterns to attain remaining goals. [x]  See Plan of Care  []  See progress note/recertification  []  See Discharge Summary         Progress towards goals / Updated goals:  Short Term Goals: To be accomplished in 3 weeks:  1) Pt will be independent in HEP.  MET  2) Pt will report being able to sleep throughout the night without being woken from pain. MET  3) Pt will report < or = 2/10 pain at worst throughout the day in order to perform ADL's. Progressing  4) Pt will report decrease in \"hitching\" of L shoulder by at least 50% in order to don/doff clothing. MET     Long Term Goals: To be accomplished in 6-8 weeks:  1) Pt will increase L shoulder flexion ROM to 180 deg in order to reach into cabinets. Progressing  2) Pt will increase L shoulder ER ROM to at least 90 deg in order to wash/dry hair. Progressing  3) Pt will increase L shoulder overall strength to at least 4+/5 in order to perform pottery work.  Progressing  4) Pt will report < or = 1/10 pain throughout the day in order to lift groceries and perform ADL's.   Progressing    PLAN  [x]  Upgrade activities as tolerated     [x]  Continue plan of care  []  Update interventions per flow sheet       []  Discharge due to:_  []  Other:_      Renate Chaudhary, PT 2/23/2017  8:38 AM

## 2017-03-02 ENCOUNTER — HOSPITAL ENCOUNTER (OUTPATIENT)
Dept: PHYSICAL THERAPY | Age: 52
Discharge: HOME | End: 2017-03-02
Payer: COMMERCIAL

## 2017-03-02 PROCEDURE — 97110 THERAPEUTIC EXERCISES: CPT | Performed by: PHYSICAL THERAPIST

## 2017-03-02 PROCEDURE — 97140 MANUAL THERAPY 1/> REGIONS: CPT | Performed by: PHYSICAL THERAPIST

## 2017-03-02 NOTE — PROGRESS NOTES
PT DAILY/Progress NOTE 2-15    Patient Name: Alison Martinez  Date:3/2/2017  : 1965  [x]  Patient  Verified  Payor: BLUE CROSS / Plan: Adrienne Rowland [id number] PPO / Product Type: PPO /    In time: 8:00 AM  Out time: 9:05 AM  Total Treatment Time (min): 65 (55 timed)  Visit #: 20    Treatment Area: Left shoulder pain [M25.512]    SUBJECTIVE  Pain Level (0-10 scale): 10   Any medication changes, allergies to medications, adverse drug reactions, diagnosis change, or new procedure performed?: [x] No    [] Yes (see summary sheet for update)  Subjective functional status/changes:   [] No changes reported  Pt reports she had a migraine a few days ago and then slept on it wrong-- \"my neck, shoulder, arm- all of it is just really tight and sore today. Everything gets tight when I have a migraine\"     OBJECTIVE    Modality rationale: decrease pain and increase tissue extensibility to improve the patients ability to reach into cabinet.    Min Type Additional Details    [] Estim: []Att   []Unatt        []TENS instruct                  []IFC  []Premod   []NMES                     []Other:  []w/US   []w/ice   []w/heat  Position:  Location:    []  Traction: [] Cervical       []Lumbar                       [] Prone          []Supine                       []Intermittent   []Continuous Lbs:  [] before manual  [] after manual  []w/heat    []  Ultrasound: []Continuous   [] Pulsed at:                            []1MHz   []3MHz Location:  W/cm2:    []  Paraffin         Location:  []w/heat   10 [x]  Ice (post)     []  Heat []  Ice massage Position: seated  Location: L shoulder    []  Laser  []  Other: Position:  Location:    []  Vasopneumatic Device Pressure:       [] lo [] med [] hi   Temperature:    [x] Skin assessment post-treatment:  [x]intact []redness- no adverse reaction    []redness  adverse reaction:     30 min Therapeutic Exercise:  [x] See flow sheet : pulleys, prone scap retractions, shoulder ext, row; IR with pulleys   sidelying ER; tband IR; tband ER, serratus punches, and B ER sidelying ER, sidelying shoulder abduction  PG alt UE's, sleeper stretch    Re-assessment see below     Rationale: increase ROM and increase strength to improve the patients ability to carry objects. -- min Neuromuscular Re-education:  [x]  See flow sheet :    Rationale: increase ROM and increase strength  to improve the patients ability to return to [employer]. 25 min Manual Therapy:  PROM L shoulder flex, IR, ER; posterior/inferior grade II-III mobs; STM R bicep tendon  Suboccipital release x3  STM/TPR B UT, LS  Manual UT stretch   Rationale: decrease pain, increase ROM and increase tissue extensibility  to improve the patients ability to lift objects. With   [x] TE   [] TA   [] neuro   [] other: Patient Education: [x] Review HEP    [] Progressed/Changed HEP based on:   [] positioning   [] body mechanics   [] transfers   [x] heat/ice application    [x] other: massage L bicep tendon     Other Objective/Functional Measures:   AROM shoulder flex= 170 deg  PROM shoulder flex= 180 deg   PROM shoulder IR= 62 deg  PROM shoulder ER= 85 deg  Functional ER= approx T3 bilaterally  Functional IR   R= T5    L= T9  Strength:   4+/5 L shoulder flex, scap, IR, ER without pain    Pain Level (0-10 scale) post treatment: 1/10     ASSESSMENT/Changes in Function:     Pt has completed 20 skilled PT visits. She has met 4/4 short term goals and 2/4 long term goals. See ROM and strength measurements above. She reports increased functional use of L UE and demonstrated increased strength and PROM, AROM. Pt progressing with L UE strengthening and ROM. Pt to continue PT once every other week for 3 weeks to meet remaining goals.      Patient will continue to benefit from skilled PT services to modify and progress therapeutic interventions, address functional mobility deficits, address ROM deficits, address strength deficits, analyze and address soft tissue restrictions and analyze and cue movement patterns to attain remaining goals. [x]  See Plan of Care  []  See progress note/recertification  []  See Discharge Summary         Progress towards goals / Updated goals:  Short Term Goals: To be accomplished in 3 weeks:  1) Pt will be independent in HEP. MET  2) Pt will report being able to sleep throughout the night without being woken from pain. MET  3) Pt will report < or = 2/10 pain at worst throughout the day in order to perform ADL's. MET  4) Pt will report decrease in \"hitching\" of L shoulder by at least 50% in order to don/doff clothing. MET     Long Term Goals: To be accomplished in 6-8 weeks:  1) Pt will increase L shoulder flexion ROM to 180 deg in order to reach into cabinets. MET  2) Pt will increase L shoulder ER ROM to at least 90 deg in order to wash/dry hair. Progressing  3) Pt will increase L shoulder overall strength to at least 4+/5 in order to perform pottery work.  MET  4) Pt will report < or = 1/10 pain throughout the day in order to lift groceries and perform ADL's.   Progressing    PLAN  [x]  Upgrade activities as tolerated     [x]  Continue plan of care  []  Update interventions per flow sheet       []  Discharge due to:_  []  Other:_      Nuris House, PT 3/2/2017  8:36 AM

## 2017-03-31 ENCOUNTER — HOSPITAL ENCOUNTER (OUTPATIENT)
Dept: PHYSICAL THERAPY | Age: 52
Discharge: HOME | End: 2017-03-31
Payer: COMMERCIAL

## 2017-03-31 PROCEDURE — 97110 THERAPEUTIC EXERCISES: CPT | Performed by: PHYSICAL THERAPIST

## 2017-03-31 PROCEDURE — 97140 MANUAL THERAPY 1/> REGIONS: CPT | Performed by: PHYSICAL THERAPIST

## 2017-03-31 NOTE — PROGRESS NOTES
PT DAILY/Progress NOTE 2-15    Patient Name: Jimenez Livingston  Date:3/31/2017  : 1965  [x]  Patient  Verified  Payor: BLUE CROSS / Plan: Adrienne Rowland [id number] PPO / Product Type: PPO /    In time: 8:00 AM  Out time: 9:10 AM  Total Treatment Time (min): 70 (60 timed)  Visit #: 21    Treatment Area: Left shoulder pain [M25.512]    SUBJECTIVE  Pain Level (0-10 scale): 2/10   Any medication changes, allergies to medications, adverse drug reactions, diagnosis change, or new procedure performed?: [x] No    [] Yes (see summary sheet for update)  Subjective functional status/changes:   [] No changes reported  Pt reports she had to go down to [place] to help her mother. She has been feeling much better, she is back to doing all her normal, everyday activities without pain. She only reports pain with random activities- \"I realize I can't do something and it hurts for a second and then goes away\". She has been doing pilates classes. OBJECTIVE    Modality rationale: decrease pain and increase tissue extensibility to improve the patients ability to reach into cabinet.    Min Type Additional Details    [] Estim: []Att   []Unatt        []TENS instruct                  []IFC  []Premod   []NMES                     []Other:  []w/US   []w/ice   []w/heat  Position:  Location:    []  Traction: [] Cervical       []Lumbar                       [] Prone          []Supine                       []Intermittent   []Continuous Lbs:  [] before manual  [] after manual  []w/heat    []  Ultrasound: []Continuous   [] Pulsed at:                            []1MHz   []3MHz Location:  W/cm2:    []  Paraffin         Location:  []w/heat   10 [x]  Ice (post)     []  Heat []  Ice massage Position: seated  Location: L shoulder    []  Laser  []  Other: Position:  Location:    []  Vasopneumatic Device Pressure:       [] lo [] med [] hi   Temperature:    [x] Skin assessment post-treatment:  [x]intact []redness- no adverse reaction []redness  adverse reaction:     35 min Therapeutic Exercise:  [x] See flow sheet : pulleys, prone scap retractions, shoulder ext, row;  IR with pulleys   sidelying ER; tband IR; tband ER, serratus punches, and B ER sidelying ER, sidelying shoulder abduction  PG alt UE's, sleeper stretch    Re-assessment see below     Rationale: increase ROM and increase strength to improve the patients ability to carry objects. -- min Neuromuscular Re-education:  [x]  See flow sheet :    Rationale: increase ROM and increase strength  to improve the patients ability to return to [employer]. 25 min Manual Therapy:  PROM L shoulder flex, IR, ER; posterior/inferior grade II-III mobs; STM R bicep tendon   Rationale: decrease pain, increase ROM and increase tissue extensibility  to improve the patients ability to lift objects. With   [x] TE   [] TA   [] neuro   [] other: Patient Education: [x] Review HEP    [] Progressed/Changed HEP based on:   [] positioning   [] body mechanics   [] transfers   [x] heat/ice application    [x] other: massage L bicep tendon     Other Objective/Functional Measures:   AROM shoulder flex= 170 deg  PROM shoulder flex= 180 deg   PROM shoulder IR= 80 deg  PROM shoulder ER= 85 deg  Functional ER= approx T3 bilaterally  Functional IR   R= T5   L= T6  Strength:   5/5 L shoulder flex, scap, IR, ER without pain    FOTO= 81/100    Pain Level (0-10 scale) post treatment: 0/10     ASSESSMENT/Changes in Function:     Pt has completed 21 skilled PT visits. She has met 3/3 short term goals and 3/4 long term goals. She demonstrates an 18 point improvement since PT evaluation on her functional outcome survey indicating an increase in functional mobility. Pt demonstrates increased L UE strength, increased L shoulder AROM and PROM, and decreased pain levels. Pt is compliant with HEP and is ready for D/C to HEP. Progress towards goals / Updated goals:  Short Term Goals:  To be accomplished in 3 weeks:  1) Pt will be independent in HEP. MET  2) Pt will report being able to sleep throughout the night without being woken from pain. MET  3) Pt will report < or = 2/10 pain at worst throughout the day in order to perform ADL's. MET  4) Pt will report decrease in \"hitching\" of L shoulder by at least 50% in order to don/doff clothing. MET     Long Term Goals: To be accomplished in 6-8 weeks:  1) Pt will increase L shoulder flexion ROM to 180 deg in order to reach into cabinets. MET  2) Pt will increase L shoulder ER ROM to at least 90 deg in order to wash/dry hair. Progressing  3) Pt will increase L shoulder overall strength to at least 4+/5 in order to perform pottery work.  MET  4) Pt will report < or = 1/10 pain throughout the day in order to lift groceries and perform ADL's.   MET    PLAN  D/C to [facility], PT 3/31/2017  8:12 AM

## 2017-04-11 NOTE — ANCILLARY DISCHARGE INSTRUCTIONS
[facility]  [address], [address]  Phone: [phone]  Fax: [phone]    Discharge Summary  2-15    Patient name: Navdeep Gilmore  : 1965  Provider#:[id number]  Referral source: Tony Steinberg (Jody) P,*      Medical/Treatment Diagnosis: Left shoulder pain [M25.512]     Prior Hospitalization: see medical history     Comorbidities: see evaluation  Prior Level of Function: see evaluation  Medications: Verified on Patient Summary List    Start of Care: 17      Onset Date: see CC. Visits from Start of Care: 21     Missed Visits: see CC  Reporting Period : 17 to 3/31/17    Summary of care:   AROM shoulder flex= 170 deg  PROM shoulder flex= 180 deg   PROM shoulder IR= 80 deg  PROM shoulder ER= 85 deg  Functional ER= approx T3 bilaterally  Functional IR  R= T5  L= T6  Strength:  5/5 L shoulder flex, scap, IR, ER without pain     FOTO= 81/100      Progress towards goals / Updated goals:  Short Term Goals: To be accomplished in 3 weeks:  1) Pt will be independent in HEP. MET  2) Pt will report being able to sleep throughout the night without being woken from pain. MET  3) Pt will report < or = 2/10 pain at worst throughout the day in order to perform ADL's. MET  4) Pt will report decrease in \"hitching\" of L shoulder by at least 50% in order to don/doff clothing. MET      Long Term Goals: To be accomplished in 6-8 weeks:  1) Pt will increase L shoulder flexion ROM to 180 deg in order to reach into cabinets. MET  2) Pt will increase L shoulder ER ROM to at least 90 deg in order to wash/dry hair. Progressing  3) Pt will increase L shoulder overall strength to at least 4+/5 in order to perform pottery work. MET  4) Pt will report < or = 1/10 pain throughout the day in order to lift groceries and perform ADL's.   MET      ASSESSMENT/Changes in Function:      Pt has completed 21 skilled PT visits. She has met 3/3 short term goals and 3/4 long term goals.  She demonstrates an 18 point improvement since PT evaluation on her functional outcome survey indicating an increase in functional mobility. Pt demonstrates increased L UE strength, increased L shoulder AROM and PROM, and decreased pain levels. Pt is compliant with HEP and is ready for D/C to HEP.      RECOMMENDATIONS:  [x]Discontinue therapy: [x]Patient has reached or is progressing toward set goals      []Patient is non-compliant or has abdicated      []Due to lack of appreciable progress towards set [address], PT 4/11/2017 9:07 AM

## 2021-02-03 ENCOUNTER — HOSPITAL ENCOUNTER (OUTPATIENT)
Dept: PHYSICAL THERAPY | Age: 56
Discharge: HOME | End: 2021-02-03
Payer: COMMERCIAL

## 2021-02-03 PROCEDURE — 97161 PT EVAL LOW COMPLEX 20 MIN: CPT | Performed by: PHYSICAL THERAPIST

## 2021-02-03 PROCEDURE — 97016 VASOPNEUMATIC DEVICE THERAPY: CPT | Performed by: PHYSICAL THERAPIST

## 2021-02-03 PROCEDURE — 97110 THERAPEUTIC EXERCISES: CPT | Performed by: PHYSICAL THERAPIST

## 2021-02-03 NOTE — PROGRESS NOTES
[facility]  [address] [place] [address], [address] [place] [address] Road  Phone: [phone]  Fax: [phone]      PT INITIAL EVALUATION NOTE - [facility] 2-15    Patient Name: Sarah Winston  Date:2/3/2021  : 1965  [x]  Patient  Verified  Payor: BLUE CROSS / Plan: Trekim Rowland [id number] PPO / Product Type: PPO /    In time:920 A  Out time: 10:10 A  Total Treatment Time (min): 50  Total Timed Codes (min): 15  1:1 Treatment Time (MC only): 35   Visit #: 1     Treatment Area: Left knee pain [M25.562]    SUBJECTIVE  Any medication changes, allergies to medications, adverse drug reactions, diagnosis change, or new procedure performed?: [] No    [x] Yes (see summary sheet for update)    Current symptoms/chief complaint and date of onset/injury:   Pt s/p L knee arthroscopy on 21, MRI +complex med meniscus tear. She states back in /2020 she injured her knee while rowing. Saw Dr. Danie Cotto, had x-rays, cortisone injection, COVID hit. She was having so much pain it was waking her up at night. Had MRI  She has been working on her ROM; trying to leave it straight     Aggravated by: inc'd movement/activity  Eased by:  Rest, elevation, ice    Pain:   9/10 max 0/10 min 2-3/10 now       Location and description of symptoms: L knee    Diagnostic Tests: [] Lab work [x] X-rays    [] CT [x] MRI     [] Other:  Results (per report of the patient): complex med meniscus tear    PMHX: Significant for frozen shoulder, arthritis, hx of R med meniscus tear/surgery several years ago  Social/Recreation/Work: Not working. Lives with , has 3 children  Prior level of function: Full ROM, able to perform all activities with pain  Patient goal(s): \"ROM, strength, decrease pain\"    Objective:    Posture/Observations:    Portal holes appear to be healing well  Minimal to no visual swelling     Gait: Antalgic gait pattern-- pt ambulates independently without AD.  Demonstrates dec'd stance time on L, smaller step length on L, dec'd knee ext    Stairs: ascends with reciprocal gait pattern  Descends with reciprocal lateral stepping pattern, holding onto railing         ROM  R AROM= 0-124 deg  L AROM= -3 - 100 deg    Strength (1-5)          NT     Functional Biomechanical Screen  SLS on R: >10 sec  SLS on L: 2-3 sec, mod postural sway    Joint Mobility:  L patellar mobs hypomobile compared to R  Outcome Measure:  FOTO NT     OBJECTIVE    15 min Therapeutic Exercise:  [x] See flow sheet : instructed in hEP   Rationale: increase ROM, increase strength, improve coordination, improve balance and increase proprioception to improve the patients ability to perform daily chores, activities with dec'd symptoms    Gameready- 15 min.  L knee          With   [x] TE   [] TA   [] neuro   [] other: Patient Education: [x] Review HEP    [] Progressed/Changed HEP based on:   [x] positioning   [] body mechanics   [] transfers   [x] heat/ice application    [x] other: marimar/phys; PT POC; importance of performing HEP in order to maximize benefit of PT; use of ice for 10-15 min 2x/day to control swelling; discussed, modified current exercise routine; advised that she should be keeping knee in full extension while resting, do not place pillow under knee      Pain Level (0-10 scale) post treatment: \"good\"    Assessment: See POC    Plan: See Joseph Merrill PT, DPT    2/3/2021

## 2021-02-03 NOTE — PROGRESS NOTES
Physical Therapy at [facility],   a part of [address] [place] [address]  [facility], [address] [place] [address]  Phone: [phone]  Fax: [phone]    Plan of Care/Statement of Necessity for Physical Therapy Services  2-15    Patient name: Yousuf Polk  : 1965  Provider#: [id number]  Referral source: Sweetie Aguilar DO      Medical/Treatment Diagnosis: Left knee pain [M25.562]     Prior Hospitalization: see medical history     Comorbidities: see evaluation  Prior Level of Function: see evaluation  Medications: Verified on Patient Summary List    Start of Care: 2/3/2021      Onset Date: 2021       The Plan of Care and following information is based on the information from the initial evaluation. Assessment/ key information: Pt is a 49year old female s/p L med meniscus arthroscopy on 2021.  She will benefit from PT to address problem list below    Evaluation Complexity History LOW Complexity : Zero comorbidities / personal factors that will impact the outcome / POC; Examination LOW Complexity : 1-2 Standardized tests and measures addressing body structure, function, activity limitation and / or participation in recreation  ;Presentation LOW Complexity : Stable, uncomplicated  ;Clinical Decision Making MEDIUM Complexity : FOTO score of 26-74  Overall Complexity Rating: LOW     Problem List: pain affecting function, decrease ROM, decrease strength, edema affecting function, impaired gait/ balance, decrease ADL/ functional abilitiies, decrease activity tolerance and decrease flexibility/ joint mobility   Treatment Plan may include any combination of the following: Therapeutic exercise, Therapeutic activities, Neuromuscular re-education, Physical agent/modality, Gait/balance training, Manual therapy, Patient education, Self Care training, Functional mobility training, Home safety training and Stair training  Patient / Family readiness to learn indicated by: asking questions, trying to perform skills and interest  Persons(s) to be included in education: patient (P)  Barriers to Learning/Limitations: None  Patient Goal (s): see evaluation  Patient Self Reported Health Status: good  Rehabilitation Potential: good    Short Term Goals: To be accomplished in 3-4 weeks:  1) Pt will be independent in initial HEP  2) Pt will report >/=25% improvement in symptoms  3) Pt will demonstrate full L knee ext in order to assist in gait mechanics  4) Pt will perform L SLS for >/=10 sec in order to demonstrate improvement in proprioception    Long Term Goals: To be accomplished in 8-10 weeks:  1) Pt will ascend, descend stairs with reciprocal gait pattern  2) Pt will demonstrate >/=120 deg AROM L knee flex in order to assist in daily chores, activities   3) Pt will report being able to walk for >/=20 min without knee pain  4) Pt will demonstrate 5/5 L knee flex, ext strength in order to return to exercise routine     Frequency / Duration: Patient to be seen 1-2 times per week for 8-10 weeks. Patient/ Caregiver education and instruction: self care, activity modification and exercises    [x]  Plan of care has been reviewed with CRISTIANE Vincent, PT 2/3/2021   ________________________________________________________________________    I certify that the above Therapy Services are being furnished while the patient is under my care. I agree with the treatment plan and certify that this therapy is necessary.     [de-identified] Signature:____________________  Date:____________Time: _________

## 2021-02-10 ENCOUNTER — HOSPITAL ENCOUNTER (OUTPATIENT)
Dept: PHYSICAL THERAPY | Age: 56
Discharge: HOME | End: 2021-02-10
Payer: COMMERCIAL

## 2021-02-10 PROCEDURE — 97140 MANUAL THERAPY 1/> REGIONS: CPT | Performed by: PHYSICAL THERAPIST

## 2021-02-10 PROCEDURE — 97110 THERAPEUTIC EXERCISES: CPT | Performed by: PHYSICAL THERAPIST

## 2021-02-10 PROCEDURE — 97016 VASOPNEUMATIC DEVICE THERAPY: CPT | Performed by: PHYSICAL THERAPIST

## 2021-02-10 NOTE — PROGRESS NOTES
PT DAILY TREATMENT NOTE - [facility] 2-15    Patient Name: Leora Gonzalez  Date:2/10/2021  : 1965  [x]  Patient  Verified  Payor: BLUE CROSS / Plan: Adrienne Rowland [id number] PPO / Product Type: PPO /    In time:1210 P  Out time:1:30 P  Total Treatment Time (min): 80  Total Timed Codes (min): 65  1:1 Treatment Time ( only): 45   Visit #:  2    Treatment Area: Left knee pain [M25.562]    SUBJECTIVE  Pain Level (0-10 scale): 3  Any medication changes, allergies to medications, adverse drug reactions, diagnosis change, or new procedure performed?: [x] No    [] Yes (see summary sheet for update)  Subjective functional status/changes:   [] No changes reported  Pt reports that she has been doing her normal routine in the AM, has been very tired/worn out by 4:00 PM. She has been taking tylenol, ibprofen which helps some.  She has started going up/down the stairs with reciprocal gait pattern    OBJECTIVE    Modality rationale: decrease edema, decrease inflammation and decrease pain to improve the patients ability to climb stairs, perform daily chores/activities with dec'd symptoms   Min Type Additional Details       [] Estim: []Att   []Unatt    []TENS instruct                  []IFC  []Premod   []NMES                     []Other:  []w/US   []w/ice   []w/heat  Position:  Location:    []  Ice     []  Heat  []  Ice massage Position:  Location:     15 [x]  Vasopneumatic Device  +compression thus reducing edema Pressure:       [] lo [x] med [] hi   Temperature: 34     [x] Skin assessment post-treatment:  [x]intact []redness- no adverse reaction    []redness  adverse reaction:     50 min Therapeutic Exercise:  [x] See flow sheet : progressed per flow sheet   Rationale: increase ROM, increase strength, improve coordination, improve balance and increase proprioception to improve the patients ability to climb stairs, perform daily activities with dec'd symptoms    15 min Manual Therapy: L patellar mobs  L rectus stretch off table  PROM L flexion-- pt seated edge of table    Rationale: decrease pain and increase ROM to improve the patients ability to exercise, perform daily chores with dec'd symptoms          With   [] TE   [] TA   [] Neuro   [] SC   [] other: Patient Education: [x] Review HEP    [] Progressed/Changed HEP based on:   [] positioning   [] body mechanics   [] transfers   [] heat/ice application    [] other:      Other Objective/Functional Measures: n/a     Pain Level (0-10 scale) post treatment: not reported    ASSESSMENT/Changes in Function:   Pt mario therapy session well; good ROM  Patient will continue to benefit from skilled PT services to modify and progress therapeutic interventions, address functional mobility deficits, address ROM deficits, address strength deficits, analyze and address soft tissue restrictions, analyze and cue movement patterns and analyze and modify body mechanics/ergonomics to attain remaining goals.      [x]  See Plan of Care  []  See progress note/recertification  []  See Discharge Summary         Progress towards goals / Updated goals:  NT    PLAN  [x]  Upgrade activities as tolerated     [x]  Continue plan of care  []  Update interventions per flow sheet       []  Discharge due to:_  []  Other:_      Rebel Garcia, PT 2/10/2021

## 2021-02-17 ENCOUNTER — HOSPITAL ENCOUNTER (OUTPATIENT)
Dept: PHYSICAL THERAPY | Age: 56
Discharge: HOME | End: 2021-02-17
Payer: COMMERCIAL

## 2021-02-17 PROCEDURE — 97140 MANUAL THERAPY 1/> REGIONS: CPT | Performed by: PHYSICAL THERAPIST

## 2021-02-17 PROCEDURE — 97016 VASOPNEUMATIC DEVICE THERAPY: CPT | Performed by: PHYSICAL THERAPIST

## 2021-02-17 PROCEDURE — 97110 THERAPEUTIC EXERCISES: CPT | Performed by: PHYSICAL THERAPIST

## 2021-02-17 NOTE — PROGRESS NOTES
PT DAILY TREATMENT NOTE - [facility] 2-15    Patient Name: Awilda Rushing  Date:2021  : 1965  [x]  Patient  Verified  Payor: BLUE CROSS / Plan: Adrienne Rowland [id number] PPO / Product Type: PPO /    In time:1210 P  Out time: 1:20 P  Total Treatment Time (min): 70  Total Timed Codes (min): 55  1:1 Treatment Time ( only): 40   Visit #:  3    Treatment Area: Left knee pain [M25.562]    SUBJECTIVE  Pain Level (0-10 scale): 2  Any medication changes, allergies to medications, adverse drug reactions, diagnosis change, or new procedure performed?: [x] No    [] Yes (see summary sheet for update)  Subjective functional status/changes:   [] No changes reported  Pt reports that she continues to have tingling/dec'd sensation in L leg-- quad through calf. \"if my  lightly touches my skin I can't even tell he is touching me\". She reports sometimes the pants she is wearing will bother her skin.  She is getting frustrated because she is still having episodes of excruciating pain   She states she had an episode of pseudo-gout after her surgery on her R knee    OBJECTIVE    Modality rationale: decrease edema, decrease inflammation and decrease pain to improve the patients ability to climb stairs, perform daily chores/activities with dec'd symptoms   Min Type Additional Details       [] Estim: []Att   []Unatt    []TENS instruct                  []IFC  []Premod   []NMES                     []Other:  []w/US   []w/ice   []w/heat  Position:  Location:    []  Ice     []  Heat  []  Ice massage Position:  Location:     15 [x]  Vasopneumatic Device  +compression thus reducing edema Pressure:       [] lo [x] med [] hi   Temperature: 34     [x] Skin assessment post-treatment:  [x]intact []redness- no adverse reaction    []redness  adverse reaction:     45 min Therapeutic Exercise:  [x] See flow sheet : progressed per flow sheet   Rationale: increase ROM, increase strength, improve coordination, improve balance and increase proprioception to improve the patients ability to climb stairs, perform daily activities with dec'd symptoms    10 min Manual Therapy: L patellar mobs  L rectus stretch off table  PROM L flexion-- pt seated edge of table    Rationale: decrease pain and increase ROM to improve the patients ability to exercise, perform daily chores with dec'd symptoms          With   [] TE   [] TA   [] Neuro   [] SC   [] other: Patient Education: [x] Review HEP    [] Progressed/Changed HEP based on:   [] positioning   [] body mechanics   [] transfers   [] heat/ice application    [] other:      Other Objective/Functional Measures: n/a     Pain Level (0-10 scale) post treatment: 1    ASSESSMENT/Changes in Function:   Updated HEP to include sidestepping with tband and prone hang. Progressing well with ROM, strength  Patient will continue to benefit from skilled PT services to modify and progress therapeutic interventions, address functional mobility deficits, address ROM deficits, address strength deficits, analyze and address soft tissue restrictions, analyze and cue movement patterns and analyze and modify body mechanics/ergonomics to attain remaining goals.      [x]  See Plan of Care  []  See progress note/recertification  []  See Discharge Summary         Progress towards goals / Updated goals:  NT    PLAN  [x]  Upgrade activities as tolerated     [x]  Continue plan of care  []  Update interventions per flow sheet       []  Discharge due to:_  []  Other:_      Travis Anderson, PT 2/17/2021

## 2021-02-24 ENCOUNTER — HOSPITAL ENCOUNTER (OUTPATIENT)
Dept: PHYSICAL THERAPY | Age: 56
Discharge: HOME | End: 2021-02-24
Payer: COMMERCIAL

## 2021-02-24 PROCEDURE — 97140 MANUAL THERAPY 1/> REGIONS: CPT | Performed by: PHYSICAL THERAPIST

## 2021-02-24 PROCEDURE — 97016 VASOPNEUMATIC DEVICE THERAPY: CPT | Performed by: PHYSICAL THERAPIST

## 2021-02-24 PROCEDURE — 97110 THERAPEUTIC EXERCISES: CPT | Performed by: PHYSICAL THERAPIST

## 2021-02-24 NOTE — PROGRESS NOTES
PT DAILY TREATMENT NOTE - [facility] -15    Patient Name: Tori Burn  Date:2021  : 1965  [x]  Patient  Verified  Payor: BLUE CROSS / Plan: Adrienne Rowland [id number] PPO / Product Type: PPO /    In time: 12:15 P  Out time: 1:45 P  Total Treatment Time (min): 90  Total Timed Codes (min): 70  1:1 Treatment Time ( only): 55   Visit #:  4    Treatment Area: Left knee pain [M25.562]    SUBJECTIVE  Pain Level (0-10 scale): 0  Any medication changes, allergies to medications, adverse drug reactions, diagnosis change, or new procedure performed?: [x] No    [] Yes (see summary sheet for update)  Subjective functional status/changes:   [] No changes reported  \"I've turned a corner\"; pt doing well.  She feels that her hips have gotten very tight, deepali on the L side    OBJECTIVE    Modality rationale: decrease edema, decrease inflammation and decrease pain to improve the patients ability to climb stairs, perform daily chores/activities with dec'd symptoms   Min Type Additional Details       [] Estim: []Att   []Unatt    []TENS instruct                  []IFC  []Premod   []NMES                     []Other:  []w/US   []w/ice   []w/heat  Position:  Location:    []  Ice     []  Heat  []  Ice massage Position:  Location:     15 [x]  Vasopneumatic Device  +compression thus reducing edema Pressure:       [] lo [x] med [] hi   Temperature: 34     [x] Skin assessment post-treatment:  [x]intact []redness- no adverse reaction    []redness  adverse reaction:     60 min Therapeutic Exercise:  [x] See flow sheet : progressed per flow sheet   Rationale: increase ROM, increase strength, improve coordination, improve balance and increase proprioception to improve the patients ability to climb stairs, perform daily activities with dec'd symptoms    10 min Manual Therapy: L patellar mobs  L rectus stretch off table  PROM L flexion-- pt seated edge of table    Rationale: decrease pain and increase ROM to improve the patients ability to exercise, perform daily chores with dec'd symptoms          With   [] TE   [] TA   [] Neuro   [] SC   [] other: Patient Education: [x] Review HEP    [] Progressed/Changed HEP based on:   [] positioning   [] body mechanics   [] transfers   [] heat/ice application    [] other:      Other Objective/Functional Measures: n/a     Pain Level (0-10 scale) post treatment: 0    ASSESSMENT/Changes in Function:    mario progression of strengthening and proprioceptive exercises well today   Patient will continue to benefit from skilled PT services to modify and progress therapeutic interventions, address functional mobility deficits, address ROM deficits, address strength deficits, analyze and address soft tissue restrictions, analyze and cue movement patterns and analyze and modify body mechanics/ergonomics to attain remaining goals.      [x]  See Plan of Care  []  See progress note/recertification  []  See Discharge Summary         Progress towards goals / Updated goals:  NT    PLAN  [x]  Upgrade activities as tolerated     [x]  Continue plan of care  []  Update interventions per flow sheet       []  Discharge due to:_  []  Other:_      Zakiya Abbott, PT 2/24/2021

## 2021-03-03 ENCOUNTER — HOSPITAL ENCOUNTER (OUTPATIENT)
Dept: PHYSICAL THERAPY | Age: 56
Discharge: HOME | End: 2021-03-03
Payer: COMMERCIAL

## 2021-03-03 PROCEDURE — 97140 MANUAL THERAPY 1/> REGIONS: CPT | Performed by: PHYSICAL THERAPIST

## 2021-03-03 PROCEDURE — 97016 VASOPNEUMATIC DEVICE THERAPY: CPT | Performed by: PHYSICAL THERAPIST

## 2021-03-03 PROCEDURE — 97110 THERAPEUTIC EXERCISES: CPT | Performed by: PHYSICAL THERAPIST

## 2021-03-03 NOTE — PROGRESS NOTES
PT DAILY TREATMENT NOTE - [facility] 2-15    Patient Name: Ella Lipscomb  Date:3/3/2021  : 1965  [x]  Patient  Verified  Payor: BLUE JAY / Plan: Adrienne Rowland [id number] PPO / Product Type: PPO /    In time: 1:00 P  Out time: 2:15 P  Total Treatment Time (min): 75  Total Timed Codes (min): 60  1:1 Treatment Time ( only): 55   Visit #:  5    Treatment Area: Left knee pain [M25.562]    SUBJECTIVE  Pain Level (0-10 scale): 0  Any medication changes, allergies to medications, adverse drug reactions, diagnosis change, or new procedure performed?: [x] No    [] Yes (see summary sheet for update)  Subjective functional status/changes:   [] No changes reported  Pt continuing to feel better.  She states that she has noticed that her knee will stiffen up if she sits/rests for too long     OBJECTIVE    Modality rationale: decrease edema, decrease inflammation and decrease pain to improve the patients ability to climb stairs, perform daily chores/activities with dec'd symptoms   Min Type Additional Details       [] Estim: []Att   []Unatt    []TENS instruct                  []IFC  []Premod   []NMES                     []Other:  []w/US   []w/ice   []w/heat  Position:  Location:    []  Ice     []  Heat  []  Ice massage Position:  Location:     15 [x]  Vasopneumatic Device  +compression thus reducing edema Pressure:       [] lo [x] med [] hi   Temperature: 34     [x] Skin assessment post-treatment:  [x]intact []redness- no adverse reaction    []redness  adverse reaction:     50 min Therapeutic Exercise:  [x] See flow sheet : progressed per flow sheet   Rationale: increase ROM, increase strength, improve coordination, improve balance and increase proprioception to improve the patients ability to climb stairs, perform daily activities with dec'd symptoms    10 min Manual Therapy: L patellar mobs  L rectus stretch off table     Rationale: decrease pain and increase ROM to improve the patients ability to exercise, perform daily chores with dec'd symptoms          With   [] TE   [] TA   [] Neuro   [] SC   [] other: Patient Education: [x] Review HEP    [] Progressed/Changed HEP based on:   [] positioning   [] body mechanics   [] transfers   [] heat/ice application    [] other:      Other Objective/Functional Measures:   L knee flex AROM symmetrical with R (in hooklying position)     Pain Level (0-10 scale) post treatment: 0    ASSESSMENT/Changes in Function:   Challenged with unilat TG squat. ROM, strength, proprioception continuing to improve. Patient will continue to benefit from skilled PT services to modify and progress therapeutic interventions, address functional mobility deficits, address ROM deficits, address strength deficits, analyze and address soft tissue restrictions, analyze and cue movement patterns and analyze and modify body mechanics/ergonomics to attain remaining goals.      [x]  See Plan of Care  []  See progress note/recertification  []  See Discharge Summary         Progress towards goals / Updated goals:  NT    PLAN  [x]  Upgrade activities as tolerated     [x]  Continue plan of care  []  Update interventions per flow sheet       []  Discharge due to:_  []  Other:_      Elvis Adams, PT 3/3/2021

## 2021-03-10 ENCOUNTER — APPOINTMENT (OUTPATIENT)
Dept: PHYSICAL THERAPY | Age: 56
End: 2021-03-10
Payer: COMMERCIAL

## 2022-05-12 ENCOUNTER — APPOINTMENT (OUTPATIENT)
Dept: ULTRASOUND IMAGING | Age: 57
End: 2022-05-12
Attending: STUDENT IN AN ORGANIZED HEALTH CARE EDUCATION/TRAINING PROGRAM
Payer: COMMERCIAL

## 2022-05-12 ENCOUNTER — APPOINTMENT (OUTPATIENT)
Dept: GENERAL RADIOLOGY | Age: 57
End: 2022-05-12
Attending: STUDENT IN AN ORGANIZED HEALTH CARE EDUCATION/TRAINING PROGRAM
Payer: COMMERCIAL

## 2022-05-12 ENCOUNTER — HOSPITAL ENCOUNTER (EMERGENCY)
Age: 57
Discharge: HOME | End: 2022-05-12
Attending: STUDENT IN AN ORGANIZED HEALTH CARE EDUCATION/TRAINING PROGRAM
Payer: COMMERCIAL

## 2022-05-12 VITALS
OXYGEN SATURATION: 98 % | WEIGHT: 180 LBS | DIASTOLIC BLOOD PRESSURE: 82 MMHG | RESPIRATION RATE: 16 BRPM | HEART RATE: 66 BPM | BODY MASS INDEX: 30.73 KG/M2 | TEMPERATURE: 98.2 F | SYSTOLIC BLOOD PRESSURE: 133 MMHG | HEIGHT: 64 IN

## 2022-05-12 DIAGNOSIS — M79.671 RIGHT FOOT PAIN: Primary | ICD-10-CM

## 2022-05-12 PROCEDURE — 93971 EXTREMITY STUDY: CPT

## 2022-05-12 PROCEDURE — 73630 X-RAY EXAM OF FOOT: CPT

## 2022-05-12 PROCEDURE — 99284 EMERGENCY DEPT VISIT MOD MDM: CPT

## 2022-05-12 RX ORDER — RIZATRIPTAN BENZOATE 10 MG/1
10 TABLET ORAL
COMMUNITY

## 2022-05-12 RX ORDER — GLUCOSAMINE SULFATE 1500 MG
POWDER IN PACKET (EA) ORAL DAILY
COMMUNITY

## 2022-05-12 RX ORDER — ZINC GLUCONATE 10 MG
LOZENGE ORAL
COMMUNITY

## 2022-05-12 NOTE — ED NOTES
Walking boot applied to right foot, patient educated on use, reports no improvement in discomfort with ambulation.

## 2022-05-12 NOTE — ED PROVIDER NOTES
HPI     Patient is a 79-year-old female who presents today for concern for DVT in the right lower extremity. Patient says that she has a history of a DVT in the left lower extremity several years ago and used to be be on eliquis but not for a long time. She said that that she woke up this morning, and had pain in her right foot going into her leg and concerned about a DVT. No known trauma or injury. Patient denies chest pain or shortness of breath. Patient presented to the ED for further evaluation. Past Medical History:   Diagnosis Date    DVT (deep venous thrombosis) (HCC)        Past Surgical History:   Procedure Laterality Date    HX HYSTERECTOMY      HX KNEE ARTHROSCOPY           History reviewed. No pertinent family history. Social History     Socioeconomic History    Marital status:      Spouse name: Not on file    Number of children: Not on file    Years of education: Not on file    Highest education level: Not on file   Occupational History    Not on file   Tobacco Use    Smoking status: Never Smoker    Smokeless tobacco: Never Used   Substance and Sexual Activity    Alcohol use: Yes     Comment: rare social    Drug use: Never    Sexual activity: Not on file   Other Topics Concern    Not on file   Social History Narrative    Not on file     Social Determinants of Health     Financial Resource Strain:     Difficulty of Paying Living Expenses: Not on file   Food Insecurity:     Worried About Running Out of Food in the Last Year: Not on file    Nigel of Food in the Last Year: Not on file   Transportation Needs:     Lack of Transportation (Medical): Not on file    Lack of Transportation (Non-Medical):  Not on file   Physical Activity:     Days of Exercise per Week: Not on file    Minutes of Exercise per Session: Not on file   Stress:     Feeling of Stress : Not on file   Social Connections:     Frequency of Communication with Friends and Family: Not on file    Frequency of Social Gatherings with Friends and Family: Not on file    Attends Buddhist Services: Not on file    Active Member of Clubs or Organizations: Not on file    Attends Club or Organization Meetings: Not on file    Marital Status: Not on file   Intimate Partner Violence:     Fear of Current or Ex-Partner: Not on file    Emotionally Abused: Not on file    Physically Abused: Not on file    Sexually Abused: Not on file   Housing Stability:     Unable to Pay for Housing in the Last Year: Not on file    Number of [place] in the Last Year: Not on file    Unstable Housing in the Last Year: Not on file         ALLERGIES: Patient has no known allergies. Review of Systems   Constitutional: Negative for appetite change and fever. HENT: Negative for congestion and rhinorrhea. Eyes: Negative for discharge and redness. Respiratory: Negative for cough and shortness of breath. Cardiovascular: Negative for chest pain. Gastrointestinal: Negative for abdominal pain, diarrhea, nausea and vomiting. Genitourinary: Negative for decreased urine volume and dysuria. Musculoskeletal: Negative for back pain. Right leg pain and right foot pain   Skin: Negative for rash and wound. Neurological: Negative for seizures and headaches. Hematological: Does not bruise/bleed easily. Psychiatric/Behavioral: Negative for agitation. All other systems reviewed and are negative. Vitals:    05/12/22 1048 05/12/22 1052   BP: (!) 141/87 133/82   Pulse: 66    Resp: 16    Temp: 98.2 °F (36.8 °C)    SpO2: 98% 98%   Weight: 81.6 kg (180 lb)    Height: 5' 4\" (1.626 m)             Physical Exam  Vitals and nursing note reviewed. Constitutional:       General: She is not in acute distress. Appearance: Normal appearance. HENT:      Head: Normocephalic and atraumatic. Nose: Nose normal.      Mouth/Throat:      Mouth: Mucous membranes are moist.      Pharynx: Oropharynx is clear.    Eyes: Extraocular Movements: Extraocular movements intact. Conjunctiva/sclera: Conjunctivae normal.      Pupils: Pupils are equal, round, and reactive to light. Cardiovascular:      Rate and Rhythm: Normal rate and regular rhythm. Pulses: Normal pulses. Heart sounds: Normal heart sounds. No murmur heard. No friction rub. No gallop. Pulmonary:      Effort: Pulmonary effort is normal.      Breath sounds: Normal breath sounds. Abdominal:      General: Bowel sounds are normal. There is no distension. Palpations: Abdomen is soft. Tenderness: There is no abdominal tenderness. Musculoskeletal:         General: Normal range of motion. Cervical back: Normal range of motion. Legs:       Comments: + Autumn's sign   Skin:     General: Skin is warm and dry. Capillary Refill: Capillary refill takes less than 2 seconds. Neurological:      General: No focal deficit present. Mental Status: She is alert and oriented to person, place, and time. Mental status is at baseline. Psychiatric:         Mood and Affect: Mood normal.          MDM        Patient is a 56year old female with history of DVT to the LLE several years ago who presents today for right leg and foot pain. On exam patient has tenderness to the right foot. Pain in her leg with flexion and extension. Duplex RLE negative for PE. Xray negative for fracture. Plan for follow up with orthopedics and symptomatic care. Patient declines boot. The patient has been re-evaluated and stable for discharge. All available radiology and laboratory results have been reviewed with patient and/or available family.   Patient and/or family verbally conveyed their understanding and agreement of the patient's signs, symptoms, diagnosis, treatment and prognosis and additionally agree to follow-up as recommended in the discharge instructions or to return to the Emergency Department should their condition change or worsen prior to their follow-up appointment. All questions have been answered and patient and/or available family express understanding. LABORATORY RESULTS:  Labs Reviewed - No data to display    IMAGING RESULTS:  XR FOOT RT MIN 3 V   Final Result      Chronic calcific plantar fasciitis. DUPLEX LOWER EXT VENOUS RIGHT   Final Result          MEDICATIONS GIVEN:  Medications - No data to display    IMPRESSION:  1. Right foot pain        PLAN:  Follow-up Information     Follow up With Specialties Details Why [id number] [facility] EMERGENCY DEPT Emergency Medicine Go to  If symptoms worsen [facility] [address]  [phone]    Marcellus Lunsford MD Orthopedic Surgery Schedule an appointment as soon as possible for a visit in 2 days  [address]  [address] [facility],[address]  [address] [facility]  [phone]           Discharge Medication List as of 5/12/2022 12:40 PM            Evelyne Bardales MD        Please note that this dictation was completed with [employer], the computer voice recognition software. Quite often unanticipated grammatical, syntax, homophones, and other interpretive errors are inadvertently transcribed by the computer software. Please disregard these errors. Please excuse any errors that have escaped final proofreading.            Procedures

## 2022-05-12 NOTE — ED TRIAGE NOTES
Patient presents ambulatory to treatment area with a steady gait. Patient states she has history of DVT and believes she has another one. Patient complains of pain to the top of her right foot that began last night. No pain in lower leg; no redness or swelling at this time. Patient traveled to [facility] over the weekend. Denies chest pain or shortness of breath.